# Patient Record
Sex: MALE | Race: WHITE | NOT HISPANIC OR LATINO | URBAN - METROPOLITAN AREA
[De-identification: names, ages, dates, MRNs, and addresses within clinical notes are randomized per-mention and may not be internally consistent; named-entity substitution may affect disease eponyms.]

---

## 2018-06-22 ENCOUNTER — APPOINTMENT (OUTPATIENT)
Dept: URBAN - METROPOLITAN AREA CLINIC 282 | Age: 73
Setting detail: DERMATOLOGY
End: 2018-06-22

## 2018-06-22 DIAGNOSIS — L82.1 OTHER SEBORRHEIC KERATOSIS: ICD-10-CM

## 2018-06-22 DIAGNOSIS — L30.9 DERMATITIS, UNSPECIFIED: ICD-10-CM

## 2018-06-22 DIAGNOSIS — I83.9 ASYMPTOMATIC VARICOSE VEINS OF LOWER EXTREMITIES: ICD-10-CM

## 2018-06-22 DIAGNOSIS — L57.8 OTHER SKIN CHANGES DUE TO CHRONIC EXPOSURE TO NONIONIZING RADIATION: ICD-10-CM

## 2018-06-22 PROBLEM — I83.93 ASYMPTOMATIC VARICOSE VEINS OF BILATERAL LOWER EXTREMITIES: Status: ACTIVE | Noted: 2018-06-22

## 2018-06-22 PROCEDURE — OTHER TREATMENT REGIMEN: OTHER

## 2018-06-22 PROCEDURE — OTHER COUNSELING: OTHER

## 2018-06-22 PROCEDURE — 99203 OFFICE O/P NEW LOW 30 MIN: CPT

## 2018-06-22 PROCEDURE — OTHER PRESCRIPTION: OTHER

## 2018-06-22 PROCEDURE — OTHER ORDER TESTS: OTHER

## 2018-06-22 RX ORDER — CLOBETASOL PROPIONATE 0.5 MG/G
OINTMENT TOPICAL
Qty: 1 | Refills: 1 | Status: ERX | COMMUNITY
Start: 2018-06-22

## 2018-06-22 ASSESSMENT — LOCATION ZONE DERM
LOCATION ZONE: FACE
LOCATION ZONE: LEG
LOCATION ZONE: FEET

## 2018-06-22 ASSESSMENT — LOCATION DETAILED DESCRIPTION DERM
LOCATION DETAILED: LEFT DISTAL CALF
LOCATION DETAILED: LEFT MEDIAL MALAR CHEEK
LOCATION DETAILED: RIGHT DISTAL CALF
LOCATION DETAILED: LEFT MEDIAL DORSAL FOOT
LOCATION DETAILED: LEFT MEDIAL POSTERIOR ANKLE

## 2018-06-22 ASSESSMENT — LOCATION SIMPLE DESCRIPTION DERM
LOCATION SIMPLE: LEFT CHEEK
LOCATION SIMPLE: LEFT POSTERIOR ANKLE
LOCATION SIMPLE: RIGHT CALF
LOCATION SIMPLE: LEFT FOOT
LOCATION SIMPLE: LEFT CALF

## 2018-06-22 NOTE — HPI: RASH
How Severe Is Your Rash?: severe
Is This A New Presentation, Or A Follow-Up?: Rash
Additional History: Rash started 10 years ago, was given Triamcinolone and Nystatin which cleared rash up to a brown/tan patch around the ankle.

## 2018-06-22 NOTE — PROCEDURE: TREATMENT REGIMEN
Samples Given: Cerave cream
Initiate Treatment: clobetasol ointment BIDX 2 weeks then for maintenance\\napply Cerave healing ointment or cream
Detail Level: Detailed

## 2018-07-25 ENCOUNTER — APPOINTMENT (OUTPATIENT)
Dept: URBAN - METROPOLITAN AREA CLINIC 282 | Age: 73
Setting detail: DERMATOLOGY
End: 2018-07-25

## 2018-07-25 DIAGNOSIS — L30.9 DERMATITIS, UNSPECIFIED: ICD-10-CM

## 2018-07-25 DIAGNOSIS — L81.9 DISORDER OF PIGMENTATION, UNSPECIFIED: ICD-10-CM

## 2018-07-25 PROCEDURE — OTHER COUNSELING: OTHER

## 2018-07-25 PROCEDURE — 99213 OFFICE O/P EST LOW 20 MIN: CPT

## 2018-07-25 ASSESSMENT — LOCATION ZONE DERM
LOCATION ZONE: FEET
LOCATION ZONE: LEG

## 2018-07-25 ASSESSMENT — LOCATION SIMPLE DESCRIPTION DERM
LOCATION SIMPLE: LEFT POSTERIOR ANKLE
LOCATION SIMPLE: LEFT FOOT

## 2018-07-25 ASSESSMENT — LOCATION DETAILED DESCRIPTION DERM
LOCATION DETAILED: LEFT MEDIAL DORSAL FOOT
LOCATION DETAILED: LEFT MEDIAL POSTERIOR ANKLE

## 2018-07-25 ASSESSMENT — SEVERITY ASSESSMENT: SEVERITY: CLEAR

## 2021-09-02 ENCOUNTER — OFFICE VISIT (OUTPATIENT)
Dept: URBAN - METROPOLITAN AREA CLINIC 30 | Facility: CLINIC | Age: 76
End: 2021-09-02
Payer: MEDICARE

## 2021-09-02 DIAGNOSIS — H04.123 TEAR FILM INSUFFICIENCY OF BILATERAL LACRIMAL GLANDS: ICD-10-CM

## 2021-09-02 PROCEDURE — 92134 CPTRZ OPH DX IMG PST SGM RTA: CPT | Performed by: OPTOMETRIST

## 2021-09-02 PROCEDURE — 99204 OFFICE O/P NEW MOD 45 MIN: CPT | Performed by: OPTOMETRIST

## 2021-09-02 ASSESSMENT — INTRAOCULAR PRESSURE
OS: 11
OD: 11

## 2021-09-02 ASSESSMENT — KERATOMETRY
OD: 45.69
OS: 45.64

## 2021-09-02 ASSESSMENT — VISUAL ACUITY
OS: 20/60
OD: 20/60

## 2021-09-02 NOTE — IMPRESSION/PLAN
Impression: Exudative macular degeneration, with active choroidal neovascularization, left eye: H35.3221. Denies FHx Plan: Pt educ, rec retina team within 1 week. MAC OCT today.

## 2021-09-02 NOTE — IMPRESSION/PLAN
Impression: Nonexudative macular degeneration, intermediate dry stage, right eye: H35.1888. Plan: Pt educ on findings. Rec AREDs 2 vitamins, sun protection, leafy greens, etc. Non smoker. No signs of exudation on exam or OCT. Pt instructed to contact us immediately if notices decline in vision.

## 2021-09-02 NOTE — IMPRESSION/PLAN
Impression: Combined forms of age-related cataract, bilateral: H25.813. Plan: Consider tx after wet AMD OS is addressed.

## 2021-09-02 NOTE — IMPRESSION/PLAN
Impression: Tear film insufficiency of bilateral lacrimal glands: H04.123. Plan: Rec ATs BID-QID OU.

## 2021-09-08 ENCOUNTER — OFFICE VISIT (OUTPATIENT)
Dept: URBAN - METROPOLITAN AREA CLINIC 24 | Facility: CLINIC | Age: 76
End: 2021-09-08
Payer: MEDICARE

## 2021-09-08 PROCEDURE — 92134 CPTRZ OPH DX IMG PST SGM RTA: CPT | Performed by: OPHTHALMOLOGY

## 2021-09-08 PROCEDURE — 99204 OFFICE O/P NEW MOD 45 MIN: CPT | Performed by: OPHTHALMOLOGY

## 2021-09-08 PROCEDURE — 67028 INJECTION EYE DRUG: CPT | Performed by: OPHTHALMOLOGY

## 2021-09-08 ASSESSMENT — INTRAOCULAR PRESSURE
OD: 15
OS: 12

## 2021-09-08 NOTE — IMPRESSION/PLAN
Impression: Cataract, bilateral: H25.813. Plan: Consider tx after wet AMD OS is addressed.   AGREE -- Fall '21 plan Ce/IOL OU

## 2021-09-08 NOTE — IMPRESSION/PLAN
Impression: DRY AMD right eye: U15.1163. Plan: DRY RIGHT eye --  Specialized examination and high-resolution imaging confirm retinal changes c/w DRY Macular Degeneration. AREDS2 formulation options reviewed  (PRNutr), diet, fish, Amsler, non-smoking.

## 2021-09-08 NOTE — IMPRESSION/PLAN
Impression: NEW WET AMD  left eye: C97.0663. Plan: LEFT eye NEW WET AMD --  Examination and imaging confirm retinal changes c/w a form of WET macular degeneration. New Avastin injection given for retinal edema / disease activity. All r/b/a reviewed. Off-label use in eye of FDA-approved drug. Theoretical low (but not zero) risk of adverse ocular event or ATE --understood and accepted. Costs / expense / insurance / risk understood -accepted. FUTURE consider BELVEDERE FOR PDS implant OS. TODAY Avastin OS injx (Proc note). RTC 4-5w HRA baseline and plan Avastin #2 initial OS   Future ND? IF improving, ALLOW Ce/IOL surgery. THEN after 4-6injx OS PLAN BELVEDERE OS Careful review of pt case, current imaging, known pathophysiology of condition, consider imaging upon next return as noted above.   MAURICE

## 2021-10-18 ENCOUNTER — OFFICE VISIT (OUTPATIENT)
Dept: URBAN - METROPOLITAN AREA CLINIC 30 | Facility: CLINIC | Age: 76
End: 2021-10-18
Payer: MEDICARE

## 2021-10-18 PROCEDURE — 92242 FLUORESCEIN&ICG ANGIOGRAPHY: CPT | Performed by: OPHTHALMOLOGY

## 2021-10-18 PROCEDURE — 92250 FUNDUS PHOTOGRAPHY W/I&R: CPT | Performed by: OPHTHALMOLOGY

## 2021-10-18 PROCEDURE — 67028 INJECTION EYE DRUG: CPT | Performed by: OPHTHALMOLOGY

## 2021-10-18 PROCEDURE — 92134 CPTRZ OPH DX IMG PST SGM RTA: CPT | Performed by: OPHTHALMOLOGY

## 2021-10-18 ASSESSMENT — INTRAOCULAR PRESSURE
OS: 10
OD: 13

## 2021-10-18 NOTE — IMPRESSION/PLAN
Impression: NEW WET AMD  left eye: I41.1330. Injx OS x Oct '21 Plan: LEFT eye NEW WET AMD -- FUTURE consider  PDS implant OS or perhaps FDA approved PDS (Fuch's pt w pending Ce/IOL surgery). ALTERNATIVE may be ATMOSPHERE so that PCIOL surgery and Fuch's are less of a concern w PDS and Randy Laboy. TODAY Avastin OS injx (Proc note). RTC 4-6w ND/inj/pos OCT - plan Avastin #3 initial OS   Future exam ? 
NOW ALLOW Ce/IOL surgery. THEN after 4-6injx OS PLAN PDS OS   Future ATMOSPHERE or PDS

## 2021-10-18 NOTE — IMPRESSION/PLAN
Impression: Cataract, bilateral: H25.813. Plan: Consider tx after wet AMD OS is addressed.   AGREE --OK NOW to RE-MEASURE and PLAN FOR Fall '21 Ce/IOL OU -- please PROCEED Ce/IOL

## 2021-11-29 ENCOUNTER — OFFICE VISIT (OUTPATIENT)
Dept: URBAN - METROPOLITAN AREA CLINIC 30 | Facility: CLINIC | Age: 76
End: 2021-11-29
Payer: MEDICARE

## 2021-11-29 DIAGNOSIS — H35.3221 EXUDATIVE AGE-RELATED MACULAR DEGENERATION, LEFT EYE, WITH ACTIVE CHOROIDAL NEOVASCULARIZATION: Primary | ICD-10-CM

## 2021-11-29 PROCEDURE — 92134 CPTRZ OPH DX IMG PST SGM RTA: CPT | Performed by: OPHTHALMOLOGY

## 2021-11-29 PROCEDURE — 67028 INJECTION EYE DRUG: CPT | Performed by: OPHTHALMOLOGY

## 2021-11-29 ASSESSMENT — INTRAOCULAR PRESSURE
OD: 13
OS: 13

## 2021-11-29 NOTE — IMPRESSION/PLAN
Impression: NEW WET AMD  left eye: Y30.5263. Injx OS x Oct '21 Plan: LEFT eye NEW WET AMD -- FUTURE consider  PDS implant OS or perhaps FDA approved PDS (Fuch's pt w pending Ce/IOL surgery). ALTERNATIVE may be ATMOSPHERE so that PCIOL surgery and Fuch's are less of a concern w PDS and Tempie Feathers. TODAY Avastin OS injx (Proc note). RTC 4-6w  dil, OCT, eval - h/o Avastin #4  initial OS  INJX INFERIOR   Future HRA? PROCEED with  Ce/IOL surgery. THEN after 4-6injx OS PLAN PDS OS Future ATMOSPHERE or PDS options -- d/w'd pt.    Add to [address]

## 2021-12-23 ENCOUNTER — OFFICE VISIT (OUTPATIENT)
Dept: URBAN - METROPOLITAN AREA CLINIC 30 | Facility: CLINIC | Age: 76
End: 2021-12-23
Payer: MEDICARE

## 2021-12-23 PROCEDURE — 99214 OFFICE O/P EST MOD 30 MIN: CPT | Performed by: OPTOMETRIST

## 2021-12-23 PROCEDURE — 92134 CPTRZ OPH DX IMG PST SGM RTA: CPT | Performed by: OPTOMETRIST

## 2021-12-23 ASSESSMENT — KERATOMETRY
OS: 45.64
OD: 45.52

## 2021-12-23 ASSESSMENT — INTRAOCULAR PRESSURE
OD: 12
OS: 12

## 2021-12-23 ASSESSMENT — VISUAL ACUITY
OD: 20/40
OS: 20/40

## 2021-12-23 NOTE — IMPRESSION/PLAN
Impression: Combined forms of age-related cataract, bilateral: H25.813. Plan: Discussed cataracts with patient. Discussed treatment options. Surgical treatment is recommended. Surgical risks and benefits discussed. Patient elects surgical treatment. Recommend surgery OU, OD first. Patient is candidate/interested in toric lens/astigmatism correction, LenSx,  standard lens, ORA. Aim OD: plano. Aim OS: plano. Patient advised of implications of lost myopia. Consider near aim. Outcome of surgery limitations include:  Fuchs' dystrophy, Nonexudative macular degeneration, intermediate dry stage, right eye, Exudative age-related macular degeneration, left eye, with active choroidal neovascularization.

## 2021-12-23 NOTE — IMPRESSION/PLAN
Impression: NEW WET AMD  left eye: M66.2991. Injx OS x Oct '21 Plan: No SRF on MAC OCT. Continue care c Dr. Tabatha Haji.

## 2021-12-23 NOTE — IMPRESSION/PLAN
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 Plan: Continue to monitor. Discussed tx options in event of progression.

## 2021-12-23 NOTE — IMPRESSION/PLAN
Impression: Nonexudative macular degeneration, intermediate dry stage, right eye: H35.4328. Plan: Continue AREDs 2 vitamins, sun protection, leafy greens, etc. Non smoker. No signs of exudation on exam or OCT. Pt instructed to contact us immediately if notices decline in vision.

## 2022-01-14 ENCOUNTER — OFFICE VISIT (OUTPATIENT)
Dept: URBAN - METROPOLITAN AREA CLINIC 24 | Facility: CLINIC | Age: 77
End: 2022-01-14
Payer: MEDICARE

## 2022-01-14 PROCEDURE — 99214 OFFICE O/P EST MOD 30 MIN: CPT | Performed by: OPHTHALMOLOGY

## 2022-01-14 PROCEDURE — 67028 INJECTION EYE DRUG: CPT | Performed by: OPHTHALMOLOGY

## 2022-01-14 PROCEDURE — 92134 CPTRZ OPH DX IMG PST SGM RTA: CPT | Performed by: OPHTHALMOLOGY

## 2022-01-14 ASSESSMENT — INTRAOCULAR PRESSURE
OS: 10
OD: 14

## 2022-01-14 NOTE — IMPRESSION/PLAN
Impression: Cataract, bilateral: H25.813. Plan:    AGREE -- Fall '21 was plan Ce/IOL OU -- did not do. MUST PROCEED ce/IOL '24 [facility] G[place]e. . . mild.   CONT w Dr. Fermin Bravo for Ce/IOL  [facility]

## 2022-01-14 NOTE — IMPRESSION/PLAN
Impression: NEW WET AMD  left  H35.3793. Injx OS x Oct '21 Plan: hx: [[LEFT NEW WET AMD - FUTURE consider PDS implant OS (study or SUSVIMO  - Fuch's pt w pending Ce/IOL surgery). ALTERNATIVE may be ATMOSPHERE or BURGUNDY so that PCIOL surgery and Fuch's are less of a concern. TODAY Avastin OS injx (Proc note). RTC 4-6w HRA / plan Avastin #5  initial OS  INJX INFERIOR    Future ND? PROCEED with  Ce/IOL surgery. THEN after 4-6injx OS PLAN future study  ATMOSPHERE/BURGUNDY  (or BELVEDERE) -- d/w'd pt.    Add to [address]

## 2022-02-01 ENCOUNTER — ADULT PHYSICAL (OUTPATIENT)
Dept: URBAN - METROPOLITAN AREA CLINIC 30 | Facility: CLINIC | Age: 77
End: 2022-02-01
Payer: MEDICARE

## 2022-02-01 DIAGNOSIS — Z01.818 ENCOUNTER FOR OTHER PREPROCEDURAL EXAMINATION: Primary | ICD-10-CM

## 2022-02-01 DIAGNOSIS — H25.813 COMBINED FORMS OF AGE-RELATED CATARACT, BILATERAL: Primary | ICD-10-CM

## 2022-02-01 PROCEDURE — 99203 OFFICE O/P NEW LOW 30 MIN: CPT | Performed by: PHYSICIAN ASSISTANT

## 2022-02-01 ASSESSMENT — PACHYMETRY
OD: 24.43
OD: 3.87
OS: 24.46
OS: 3.72

## 2022-02-02 ENCOUNTER — PRE-OPERATIVE VISIT (OUTPATIENT)
Dept: URBAN - METROPOLITAN AREA CLINIC 24 | Facility: CLINIC | Age: 77
End: 2022-02-02
Payer: MEDICARE

## 2022-02-02 DIAGNOSIS — H35.3112 NONEXUDATIVE MACULAR DEGENERATION, INTERMEDIATE DRY STAGE, RIGHT EYE: ICD-10-CM

## 2022-02-02 DIAGNOSIS — H18.513 FUCHS' DYSTROPHY: ICD-10-CM

## 2022-02-02 PROCEDURE — 99204 OFFICE O/P NEW MOD 45 MIN: CPT | Performed by: OPHTHALMOLOGY

## 2022-02-02 PROCEDURE — 99214 OFFICE O/P EST MOD 30 MIN: CPT | Performed by: OPHTHALMOLOGY

## 2022-02-02 RX ORDER — PREDNISOLONE ACETATE 10 MG/ML
1 % SUSPENSION/ DROPS OPHTHALMIC
Qty: 5 | Refills: 1 | Status: ACTIVE
Start: 2022-02-02

## 2022-02-02 RX ORDER — DICLOFENAC SODIUM 1 MG/ML
0.1 % SOLUTION/ DROPS OPHTHALMIC
Qty: 5 | Refills: 1 | Status: ACTIVE
Start: 2022-02-02

## 2022-02-02 RX ORDER — OFLOXACIN 3 MG/ML
0.3 % SOLUTION/ DROPS OPHTHALMIC
Qty: 5 | Refills: 1 | Status: ACTIVE
Start: 2022-02-02

## 2022-02-02 ASSESSMENT — INTRAOCULAR PRESSURE
OS: 13
OD: 17

## 2022-02-02 NOTE — IMPRESSION/PLAN
Impression: Combined forms of age-related cataract, bilateral: H25.813. Plan: Discussed cataract diagnosis with the patient. Discussed risks, benefits and alternatives to surgery including but not limited to: bleeding, infection, risk of vision loss, loss of the eye, need for other surgery. Patient voiced understanding and wishes to proceed. Patient elects surgical treatment. Advanced technology options Discussed with patient . Patient desires surgery OU, OD   first (( AIM - 0.25 OU, no DEXYCU, gtts d/t study w/  [facility], moxi, Topical anesthesia, no Lensx, ORA OU, hx of FUCHS')) Patient understands the need for glasses after surgery for BCVA.

## 2022-02-02 NOTE — IMPRESSION/PLAN
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 Plan: Monitor. Discussed with patient, understands this may limit vision after surgery.

## 2022-02-02 NOTE — IMPRESSION/PLAN
Impression: Nonexudative macular degeneration, intermediate dry stage, right eye: H35.3112. Plan: undilated exam, Macular degeneration, dry type, appears progressed with decreased vision. Discussed diagnosis in detail with patient. Use of vitamins has shown to improve the effects of ARMD. Discussed treatment options with patient. Counseling about the benefits and/or risks of the Age-Related Eye Disease Study (AREDS) formulation for preventing progression of age-related macular degeneration (AMD) was provided to the patient. Advised patient to eat green leafy vegetables. Use of Amsler grid was explained. No fluid or heme noted. Will continue to monitor vision and the patient has been instructed to call with any vision changes. Discussed with patient, understands this may limit vision after surgery.

## 2022-02-02 NOTE — IMPRESSION/PLAN
Impression: NEW WET AMD  left  H35.3221. Injx OS x Oct '21 Plan: hx: [[LEFT NEW WET AMD - FUTURE consider PDS implant OS. Continue all care w/ Dr. Shabana Duff. Discussed with patient, understands this may limit vision after surgery.

## 2022-02-08 ENCOUNTER — SURGERY (OUTPATIENT)
Dept: URBAN - METROPOLITAN AREA SURGERY 12 | Facility: SURGERY | Age: 77
End: 2022-02-08
Payer: MEDICARE

## 2022-02-08 PROCEDURE — PR1CP PR1CP: CUSTOM | Performed by: OPHTHALMOLOGY

## 2022-02-08 PROCEDURE — 66984 XCAPSL CTRC RMVL W/O ECP: CPT | Performed by: OPHTHALMOLOGY

## 2022-02-09 ENCOUNTER — POST-OPERATIVE VISIT (OUTPATIENT)
Dept: URBAN - METROPOLITAN AREA CLINIC 30 | Facility: CLINIC | Age: 77
End: 2022-02-09

## 2022-02-09 DIAGNOSIS — Z48.810 ENCOUNTER FOR SURGICAL AFTERCARE FOLLOWING SURGERY ON A SENSE ORGAN: Primary | ICD-10-CM

## 2022-02-09 PROCEDURE — 99024 POSTOP FOLLOW-UP VISIT: CPT | Performed by: OPTOMETRIST

## 2022-02-09 ASSESSMENT — INTRAOCULAR PRESSURE: OD: 15

## 2022-02-09 NOTE — IMPRESSION/PLAN
Impression: S/P ORA/Cataract Extraction by phacoemulsification with IOL placement OD - 1 Day. Encounter for surgical aftercare following surgery on a sense organ  Z48.810.  Plan: 3 PO drops-gave bubble chart to pt
rtc 1 week PO

## 2022-02-15 ENCOUNTER — POST-OPERATIVE VISIT (OUTPATIENT)
Dept: URBAN - METROPOLITAN AREA CLINIC 30 | Facility: CLINIC | Age: 77
End: 2022-02-15
Payer: MEDICARE

## 2022-02-15 PROCEDURE — 99024 POSTOP FOLLOW-UP VISIT: CPT | Performed by: OPTOMETRIST

## 2022-02-15 ASSESSMENT — INTRAOCULAR PRESSURE
OS: 15
OD: 14

## 2022-02-15 ASSESSMENT — VISUAL ACUITY
OD: 20/50
OS: 20/60

## 2022-02-15 ASSESSMENT — KERATOMETRY
OS: 45.20
OD: 45.95

## 2022-02-15 NOTE — IMPRESSION/PLAN
Impression: S/P ORA/Cataract Extraction by phacoemulsification with IOL placement OD - 7 Days. Encounter for surgical aftercare following surgery on a sense organ  Z48.810. Plan: Patient is pleased with vision OD. Limited by Fuchs, AMD. 
ok to proceed with 2nd eye

## 2022-02-17 ENCOUNTER — OFFICE VISIT (OUTPATIENT)
Dept: URBAN - METROPOLITAN AREA CLINIC 24 | Facility: CLINIC | Age: 77
End: 2022-02-17
Payer: MEDICARE

## 2022-02-17 PROCEDURE — 92250 FUNDUS PHOTOGRAPHY W/I&R: CPT | Performed by: OPHTHALMOLOGY

## 2022-02-17 PROCEDURE — 67028 INJECTION EYE DRUG: CPT | Performed by: OPHTHALMOLOGY

## 2022-02-17 PROCEDURE — 92134 CPTRZ OPH DX IMG PST SGM RTA: CPT | Performed by: OPHTHALMOLOGY

## 2022-02-17 ASSESSMENT — INTRAOCULAR PRESSURE
OS: 8
OD: 11

## 2022-02-17 NOTE — IMPRESSION/PLAN
Impression: NEW WET AMD  left  H35.3221. Injx OS x Oct '21 Plan: hx: [[LEFT NEW WET AMD - FUTURE consider PDS implant OS (study or SUSVIMO  - Fuch's pt w pending Ce/IOL surgery). ALTERNATIVE may be ATMOSPHERE or BURGUNDY so that PCIOL surgery and Fuch's are less of a concern]]. TODAY Avastin OS injx (Proc note - DONE w Ce/IOL OS 2/22/22). RTC 4-6w ND/inj/OCT PLAN INFERIOR site Avastin INJX (1st inj Sep '21). IF INJX is done do INFERIOR INJX (future PDS?) AFTER Ce/IOL surgery EVAL options for BURGUNDY SCREENING OS May 2022 -- ONLY if FAIL BURGUNDY consider future ASCENT (or AUG '22 [facility]) -- d/w'd pt.    Add to [address] and to [address]

## 2022-02-22 ENCOUNTER — SURGERY (OUTPATIENT)
Dept: URBAN - METROPOLITAN AREA SURGERY 12 | Facility: SURGERY | Age: 77
End: 2022-02-22
Payer: MEDICARE

## 2022-02-22 DIAGNOSIS — H25.812 COMBINED FORMS OF AGE-RELATED CATARACT, LEFT EYE: Primary | ICD-10-CM

## 2022-02-22 PROCEDURE — 66984 XCAPSL CTRC RMVL W/O ECP: CPT | Performed by: OPHTHALMOLOGY

## 2022-02-22 PROCEDURE — PR1CP PR1CP: CUSTOM | Performed by: OPHTHALMOLOGY

## 2022-02-23 ENCOUNTER — POST-OPERATIVE VISIT (OUTPATIENT)
Dept: URBAN - METROPOLITAN AREA CLINIC 30 | Facility: CLINIC | Age: 77
End: 2022-02-23

## 2022-02-23 DIAGNOSIS — Z96.1 PRESENCE OF INTRAOCULAR LENS: Primary | ICD-10-CM

## 2022-02-23 PROCEDURE — 99024 POSTOP FOLLOW-UP VISIT: CPT | Performed by: OPTOMETRIST

## 2022-02-23 ASSESSMENT — INTRAOCULAR PRESSURE: OS: 15

## 2022-02-23 NOTE — IMPRESSION/PLAN
Impression: S/P Cataract Extraction by phacoemulsification with IOL placement; ORA OS - 1 Day. Presence of intraocular lens  Z96.1. Plan: Continue ATs TID-QID OU. 
3 PO drops-gave chart (no dexycu) RTC for final PO. pt is pleased thus far

## 2022-03-22 ENCOUNTER — POST-OPERATIVE VISIT (OUTPATIENT)
Dept: URBAN - METROPOLITAN AREA CLINIC 30 | Facility: CLINIC | Age: 77
End: 2022-03-22

## 2022-03-22 PROCEDURE — 99024 POSTOP FOLLOW-UP VISIT: CPT | Performed by: OPTOMETRIST

## 2022-03-22 ASSESSMENT — VISUAL ACUITY
OD: 20/40
OS: 20/30

## 2022-03-22 ASSESSMENT — INTRAOCULAR PRESSURE
OD: 15
OS: 16

## 2022-03-22 ASSESSMENT — KERATOMETRY
OS: 44.72
OD: 45.77

## 2022-03-22 NOTE — IMPRESSION/PLAN
Impression: S/P Cataract Extraction by phacoemulsification with IOL placement; ORA OS - 28 Days. Encounter for surgical aftercare following surgery on a sense organ  Z48.810. Plan: AMD limits, MAC OCT today: CME OD and few cysts OS
resume pred OD TID and diclofenac TID OD until upcoming retina appt with Dr. Claudio Dunbar PO c REFRACTION c Dr. Fany Taveras in 1 month.

## 2022-04-01 ENCOUNTER — OFFICE VISIT (OUTPATIENT)
Dept: URBAN - METROPOLITAN AREA CLINIC 10 | Facility: CLINIC | Age: 77
End: 2022-04-01
Payer: MEDICARE

## 2022-04-01 PROCEDURE — 92134 CPTRZ OPH DX IMG PST SGM RTA: CPT | Performed by: OPHTHALMOLOGY

## 2022-04-01 PROCEDURE — 67028 INJECTION EYE DRUG: CPT | Performed by: OPHTHALMOLOGY

## 2022-04-01 PROCEDURE — 99214 OFFICE O/P EST MOD 30 MIN: CPT | Performed by: OPHTHALMOLOGY

## 2022-04-01 ASSESSMENT — INTRAOCULAR PRESSURE
OD: 12
OS: 12

## 2022-04-01 NOTE — IMPRESSION/PLAN
Impression: NEW WET AMD  OS  H35.3221. Injx OS x Oct '21 Plan: hx: [[LEFT NEW WET AMD - FUTURE consider PDS implant OS (study or SUSVIMO  - Fuch's pt w pending Ce/IOL surgery). ALTERNATIVE may be ATMOSPHERE or BURGUNDY so that PCIOL surgery and Fuch's are less of a concern]]. TODAY Avastin OS injx (Proc note - DONE w Ce/IOL OS 2/22/22). RTC 4-6w EXAM and EVAL in PRICE -- IF STABLE, RE-CONSIDER LIZA or BELV Pt was cautious, but still wants to CONSIDER BURGUNDY or Filipe Mariel is done do INFERIOR INJX (future PDS study? -- Last injx OS 4/1/22) Ce/IOL surgery DONE -- EVAL options for BURGUNDY SCREENING OS May 2022 -- ONLY if FAIL BURGUNDY consider future ASCENT (or AUG '[facility]) -- d/w'd pt.    Add to [address] and to [address]

## 2022-04-01 NOTE — IMPRESSION/PLAN
Impression: Tear film insufficiency Plan: This is NOT disqualifying K ANDRY. Few guttae is NOT classic FUCH's. Still OK for study. Consider options.

## 2022-04-22 ENCOUNTER — POST-OPERATIVE VISIT (OUTPATIENT)
Dept: URBAN - METROPOLITAN AREA CLINIC 30 | Facility: CLINIC | Age: 77
End: 2022-04-22
Payer: MEDICARE

## 2022-04-22 DIAGNOSIS — Z48.810 ENCOUNTER FOR SURGICAL AFTERCARE FOLLOWING SURGERY ON A SENSE ORGAN: Primary | ICD-10-CM

## 2022-04-22 PROCEDURE — 99024 POSTOP FOLLOW-UP VISIT: CPT | Performed by: OPTOMETRIST

## 2022-04-22 ASSESSMENT — INTRAOCULAR PRESSURE
OS: 12
OD: 13

## 2022-04-22 ASSESSMENT — KERATOMETRY
OS: 45.55
OD: 45.77

## 2022-04-22 ASSESSMENT — VISUAL ACUITY
OD: 20/40
OS: 20/30

## 2022-04-22 NOTE — IMPRESSION/PLAN
Impression:  Encounter for surgical aftercare following surgery on a sense organ  Z48.810. Plan: CME resolved OD, can d/c pred and diclofenac (stopped 2 days ago)
call if s/sx recur
defers spec rx, happy with outcome of cataract sx Dec 2022 CE c Dr Pily Ross; Dr Corry Hope as scheduled

## 2022-05-12 ENCOUNTER — OFFICE VISIT (OUTPATIENT)
Dept: URBAN - METROPOLITAN AREA CLINIC 24 | Facility: CLINIC | Age: 77
End: 2022-05-12
Payer: MEDICARE

## 2022-05-12 DIAGNOSIS — H04.123 TEAR FILM INSUFFICIENCY OF BILATERAL LACRIMAL GLANDS: ICD-10-CM

## 2022-05-12 DIAGNOSIS — H35.3221 EXUDATIVE AGE-RELATED MACULAR DEGENERATION, LEFT EYE, WITH ACTIVE CHOROIDAL NEOVASCULARIZATION: Primary | ICD-10-CM

## 2022-05-12 PROCEDURE — 67028 INJECTION EYE DRUG: CPT | Performed by: OPHTHALMOLOGY

## 2022-05-12 PROCEDURE — 99214 OFFICE O/P EST MOD 30 MIN: CPT | Performed by: OPHTHALMOLOGY

## 2022-05-12 PROCEDURE — 92134 CPTRZ OPH DX IMG PST SGM RTA: CPT | Performed by: OPHTHALMOLOGY

## 2022-05-12 ASSESSMENT — INTRAOCULAR PRESSURE
OD: 11
OS: 10

## 2022-05-12 NOTE — IMPRESSION/PLAN
Impression: NEW WET AMD  OS  H35.3221. Injx OS x Oct '21 Plan: hx: [[LEFT NEW WET AMD - FUTURE consider PDS implant OS (study or SUSVIMO  - Fuch's pt w pending Ce/IOL surgery). ALTERNATIVE may be ATMOSPHERE or BURGUNDY so that PCIOL surgery and Fuch's are less of a concern]]. TODAY Avastin OS injx (Proc note - DONE w Ce/IOL OS 2/22/22). RTC 4-6w  ND/inj/OCT plan Avastin OS INFERIOR site Pt is cautious, (ADD 5/5/22 email note:  FOR NOW pt wants ONGOING INJECTIONS and has addt'l QUESTIONS for PDS implant SHANNAN or Jimbo Weir -- likely Manuel Kent unless pt decides otherwise for STUDY). IF Carolee Limb is done do INFERIOR INJX (future PDS study? -- Last injx OS 4/1/22) Ce/IOL surgery DONE -- EVAL options for BURGUNDY SCREENING OS May 2022 -- ONLY if FAIL BURGUNDY consider future ASCENT (or AUG '22 [facility]) -- d/w'd pt.    Add to [address] and to [address]

## 2022-06-14 ENCOUNTER — OFFICE VISIT (OUTPATIENT)
Dept: URBAN - METROPOLITAN AREA CLINIC 10 | Facility: CLINIC | Age: 77
End: 2022-06-14
Payer: MEDICARE

## 2022-06-14 DIAGNOSIS — H35.3221 EXUDATIVE AGE-RELATED MACULAR DEGENERATION, LEFT EYE, WITH ACTIVE CHOROIDAL NEOVASCULARIZATION: Primary | ICD-10-CM

## 2022-06-14 DIAGNOSIS — H04.123 TEAR FILM INSUFFICIENCY OF BILATERAL LACRIMAL GLANDS: ICD-10-CM

## 2022-06-14 PROCEDURE — 67028 INJECTION EYE DRUG: CPT | Performed by: OPHTHALMOLOGY

## 2022-06-14 PROCEDURE — 92134 CPTRZ OPH DX IMG PST SGM RTA: CPT | Performed by: OPHTHALMOLOGY

## 2022-06-14 ASSESSMENT — INTRAOCULAR PRESSURE
OS: 12
OD: 12

## 2022-06-14 NOTE — IMPRESSION/PLAN
Impression: Tear film insufficiency Plan: This is NOT disqualifying NOELLE WILDE. Few guttae is NOT classic FUCH's. Still OK for study. Consider options.   ADD VISTA [employer] No

## 2022-06-14 NOTE — IMPRESSION/PLAN
Impression: NEW WET AMD  OS  H35.3221. Injx OS x Oct '21 Plan: hx: [[LEFT NEW WET AMD - FUTURE consider PDS implant OS (study or SUSVIMO  - Fuch's pt w pending Ce/IOL surgery). ALTERNATIVE may be ATMOSPHERE or BURGUNDY so that PCIOL surgery and Fuch's are less of a concern]]. TODAY Avastin OS injx (Proc note - DONE w Ce/IOL OS 2/22/22). RTC 4-6w dil, OCT, eval - h/o Avastin OS INFERIOR site Pt is cautious, (ADD 5/5/22 email note:  FOR NOW pt wants ONGOING INJECTIONS and has addt'l QUESTIONS for PDS implant SHANNAN or Romain Ruby -- likely Melissa Neighbors unless pt decides otherwise for STUDY). IF Rudolm Darter is done do INFERIOR INJX (future PDS study? -- Last injx OS 4/1/22) Ce/IOL surgery DONE -- Pt deferred BURGUNDY SCREENING OS spring '22 -- future ASCENT (or AUG '22 [facility]) -- d/w'd pt.    Add to [address] and to [address]

## 2022-07-27 ENCOUNTER — PROCEDURE (OUTPATIENT)
Dept: URBAN - METROPOLITAN AREA CLINIC 24 | Facility: CLINIC | Age: 77
End: 2022-07-27
Payer: MEDICARE

## 2022-07-27 DIAGNOSIS — H35.3221 EXUDATIVE AGE-RELATED MACULAR DEGENERATION, LEFT EYE, WITH ACTIVE CHOROIDAL NEOVASCULARIZATION: Primary | ICD-10-CM

## 2022-07-27 DIAGNOSIS — H04.123 TEAR FILM INSUFFICIENCY OF BILATERAL LACRIMAL GLANDS: ICD-10-CM

## 2022-07-27 PROCEDURE — 67028 INJECTION EYE DRUG: CPT | Performed by: OPHTHALMOLOGY

## 2022-07-27 PROCEDURE — 92134 CPTRZ OPH DX IMG PST SGM RTA: CPT | Performed by: OPHTHALMOLOGY

## 2022-07-27 PROCEDURE — 99214 OFFICE O/P EST MOD 30 MIN: CPT | Performed by: OPHTHALMOLOGY

## 2022-07-27 ASSESSMENT — INTRAOCULAR PRESSURE
OD: 10
OS: 9

## 2022-07-27 NOTE — IMPRESSION/PLAN
Impression: NEW WET AMD  OS  H35.3221. Injx OS x Oct '21 Plan: hx: [[LEFT NEW WET AMD - FUTURE consider PDS implant OS (study or SUSVIMO  - Fuch's pt w pending Ce/IOL surgery). ALTERNATIVE may be ATMOSPHERE or BURGUNDY so that PCIOL surgery and Fuch's are less of a concern]]. TODAY Avastin OS injx (Proc note - DONE w Ce/IOL OS 2/22/22) RTC 4-6w pos HRA / plan Avastin OS INFERIOR site inj (FUTURE SUSVIMO?) Pt is cautious, (ADD 5/5/22 email note:  FOR NOW pt wants ONGOING INJECTIONS and has addt'l QUESTIONS for PDS implant BURGUNDY or [employer] -- likely Theodis Dubin unless pt decides otherwise for STUDY). IF Leetta Sables is done do INFERIOR INJX (future PDS study? -- Last injx OS 4/1/22) Ce/IOL surgery DONE -- Pt deferred BURGUNDY SCREENING OS spring '22 -- future ASCENT (or AUG [facility]) -- d/w'd pt.    Add to [address] and to [address]

## 2022-07-27 NOTE — IMPRESSION/PLAN
Impression: Tear film insufficiency Plan: TODAY exam shows good surface, improved on AT's. Reassured pt: This is NOT disqualifying K SICCA. Few guttae is NOT classic FUCH's. Still OK for study. Consider options.   ADD VISTA [employer]

## 2022-09-07 ENCOUNTER — OFFICE VISIT (OUTPATIENT)
Dept: URBAN - METROPOLITAN AREA CLINIC 24 | Facility: CLINIC | Age: 77
End: 2022-09-07
Payer: MEDICARE

## 2022-09-07 DIAGNOSIS — H35.3221 EXUDATIVE AGE-RELATED MACULAR DEGENERATION, LEFT EYE, WITH ACTIVE CHOROIDAL NEOVASCULARIZATION: Primary | ICD-10-CM

## 2022-09-07 PROCEDURE — 92134 CPTRZ OPH DX IMG PST SGM RTA: CPT | Performed by: OPHTHALMOLOGY

## 2022-09-07 PROCEDURE — 67028 INJECTION EYE DRUG: CPT | Performed by: OPHTHALMOLOGY

## 2022-09-07 PROCEDURE — 92250 FUNDUS PHOTOGRAPHY W/I&R: CPT | Performed by: OPHTHALMOLOGY

## 2022-09-07 PROCEDURE — 92242 FLUORESCEIN&ICG ANGIOGRAPHY: CPT | Performed by: OPHTHALMOLOGY

## 2022-09-07 ASSESSMENT — INTRAOCULAR PRESSURE
OS: 6
OD: 6

## 2022-09-07 NOTE — IMPRESSION/PLAN
Impression: NEW WET AMD  OS  H35.3221. Injx OS x Oct '21 Plan: hx: [[LEFT NEW WET AMD - FUTURE consider PDS implant OS (study or SUSVIMO  - Fuch's pt w pending Ce/IOL surgery). ALTERNATIVE may be ATMOSPHERE or BURGUNDY so that PCIOL surgery and Fuch's are less of a concern]]. TODAY Avastin OS inj (Proc note - DONE w Ce/IOL OS 2/22/22) RTC 4-6w pos  ND/inj/OCT plan Avastin OS INFERIOR site inj (FUTURE SUSVIMO?) Pt is cautious, (ADD 5/5/22 email note:  FOR NOW pt wants ONGOING INJECTIONS and has addt'l QUESTIONS for PDS implant SHANNAN or Chon Quezada -- likely [place]  unless pt decides otherwise for STUDY). IF Eather Relic is done do INFERIOR INJX (future PDS study? -- Last injx OS 4/1/22) Ce/IOL surgery DONE -- Pt deferred BURGUNDY SCREENING OS spring '22 -- future ASCENT (or AUG '22 [facility]) -- d/w'd pt.    Add to  [address]

## 2022-10-24 ENCOUNTER — PROCEDURE (OUTPATIENT)
Dept: URBAN - METROPOLITAN AREA CLINIC 24 | Facility: CLINIC | Age: 77
End: 2022-10-24
Payer: MEDICARE

## 2022-10-24 DIAGNOSIS — H04.123 TEAR FILM INSUFFICIENCY OF BILATERAL LACRIMAL GLANDS: ICD-10-CM

## 2022-10-24 DIAGNOSIS — H35.3221 EXUDATIVE AGE-RELATED MACULAR DEGENERATION, LEFT EYE, WITH ACTIVE CHOROIDAL NEOVASCULARIZATION: Primary | ICD-10-CM

## 2022-10-24 PROCEDURE — 67028 INJECTION EYE DRUG: CPT | Performed by: OPHTHALMOLOGY

## 2022-10-24 PROCEDURE — 92134 CPTRZ OPH DX IMG PST SGM RTA: CPT | Performed by: OPHTHALMOLOGY

## 2022-10-24 ASSESSMENT — INTRAOCULAR PRESSURE
OS: 15
OD: 18

## 2022-10-24 NOTE — IMPRESSION/PLAN
Impression: NEW WET AMD  OS  H35.3221. Injx OS x Oct '21 Plan: hx: [[LEFT NEW WET AMD - FUTURE consider PDS implant OS (study or SUSVIMO  - Fuch's pt w pending Ce/IOL surgery). ALTERNATIVE may be ATMOSPHERE or BURGUNDY so that PCIOL surgery and Fuch's are less of a concern]]. TODAY Avstn OS inj (Proc note - DONE w Ce/IOL OS 2/22/22) RTC 4-6w dil, pos OCT, eval -h/o Avstn OS INFERIOR site inj (future SUSVIMO 2.0) Pt is cautious, (ADD 5/5/22 email note:  FOR NOW pt wants ONGOING INJECTIONS and has addt'l QUESTIONS for PDS implant SHANNAN or Ofelia Nichols -- likely Cooper Duong unless pt decides otherwise for STUDY). IF Alis Livingston is done do INFERIOR INJX (future PDS study? -- Last injx OS 4/1/22) Ce/IOL surgery DONE -- Pt deferred BURGUNDY SCREENING OS spring '22 -- future ASCENT (or AUG '22 [facility]) -- d/w'd pt.    Add to  [address]

## 2022-12-05 ENCOUNTER — OFFICE VISIT (OUTPATIENT)
Dept: URBAN - METROPOLITAN AREA CLINIC 24 | Facility: CLINIC | Age: 77
End: 2022-12-05
Payer: MEDICARE

## 2022-12-05 DIAGNOSIS — H04.123 TEAR FILM INSUFFICIENCY OF BILATERAL LACRIMAL GLANDS: ICD-10-CM

## 2022-12-05 DIAGNOSIS — H35.3221 EXUDATIVE AGE-RELATED MACULAR DEGENERATION, LEFT EYE, WITH ACTIVE CHOROIDAL NEOVASCULARIZATION: Primary | ICD-10-CM

## 2022-12-05 PROCEDURE — 99214 OFFICE O/P EST MOD 30 MIN: CPT | Performed by: OPHTHALMOLOGY

## 2022-12-05 PROCEDURE — 92134 CPTRZ OPH DX IMG PST SGM RTA: CPT | Performed by: OPHTHALMOLOGY

## 2022-12-05 PROCEDURE — 67028 INJECTION EYE DRUG: CPT | Performed by: OPHTHALMOLOGY

## 2022-12-05 ASSESSMENT — INTRAOCULAR PRESSURE
OD: 10
OS: 10

## 2022-12-05 NOTE — IMPRESSION/PLAN
Impression: Tear film insufficiency Plan: TODAY repeated exam w good surface, improved on AT's. Reassured pt: This is NOT disqualifying K SICCA. Few guttae is NOT classic FUCH's. Still OK for study. Consider options.   ADD VISTA [employer]

## 2022-12-05 NOTE — IMPRESSION/PLAN
Impression: NEW WET AMD  OS  H35.3221. Injx OS x Oct '21 INFERIOR injx. PDS 2.0 future? Plan: hx: [[LEFT NEW WET AMD - FUTURE consider PDS implant OS (study or SUSVIMO  - Fuch's pt w pending Ce/IOL surgery). ALTERNATIVE may be ATMOSPHERE or BURGUNDY so that PCIOL surgery and Fuch's are less of a concern]]. TODAY Avstn OS inj (Proc note - DONE w Ce/IOL OS 2/22/22) RTC  6w pos HRA update / plan Avstn OS INFERIOR site inj (future SUSVIMO 2.0) Pt is cautious, (ADD 5/5/22 email note:  FOR NOW pt wants ONGOING INJECTIONS and has addt'l QUESTIONS for PDS implant BURGUNDY or Magaly Bonds -- likely [place] unless pt decides otherwise for STUDY). IF Nini Landon is done do INFERIOR INJX (future PDS study? -- Last injx OS 4/1/22) Ce/IOL surgery DONE -- Pt deferred BURGUNDY SCREENING OS spring '22 -- future ASCENT (or AUG '22 [facility]) -- d/w'd pt.    Add to  [address]

## 2022-12-19 ENCOUNTER — OFFICE VISIT (OUTPATIENT)
Dept: URBAN - METROPOLITAN AREA CLINIC 30 | Facility: CLINIC | Age: 77
End: 2022-12-19
Payer: MEDICARE

## 2022-12-19 DIAGNOSIS — H35.3112 NONEXUDATIVE MACULAR DEGENERATION, INTERMEDIATE DRY STAGE, RIGHT EYE: ICD-10-CM

## 2022-12-19 DIAGNOSIS — H35.3221 EXUDATIVE MACULAR DEGENERATION, WITH ACTIVE CHOROIDAL NEOVASCULARIZATION, LEFT EYE: ICD-10-CM

## 2022-12-19 DIAGNOSIS — Z96.1 PRESENCE OF PSEUDOPHAKIA: Primary | ICD-10-CM

## 2022-12-19 PROCEDURE — 92014 COMPRE OPH EXAM EST PT 1/>: CPT | Performed by: OPTOMETRIST

## 2022-12-19 ASSESSMENT — VISUAL ACUITY
OD: 20/30
OS: 20/40

## 2022-12-19 ASSESSMENT — INTRAOCULAR PRESSURE
OD: 10
OS: 10

## 2022-12-19 ASSESSMENT — KERATOMETRY
OD: 45.77
OS: 45.49

## 2022-12-19 NOTE — IMPRESSION/PLAN
Impression: Presence of pseudophakia: Z96.1. Plan: Pt is pleased with outcome of cataract surgeries, opts for otc readers. Discussed PC haze.

## 2022-12-19 NOTE — IMPRESSION/PLAN
Impression: NEW WET AMD  OS  H35.7752. Injx OS x Oct '21 Plan: s/p multiple injections, cont care c Dr. Ritika Zimmerman as scheduled next month.

## 2022-12-19 NOTE — IMPRESSION/PLAN
Impression: Nonexudative macular degeneration, intermediate dry stage, right eye: H35.2968. Plan: Cont care c Dr. Valentina Hidalgo, cont AREDS 2 vitamins.

## 2023-01-23 ENCOUNTER — OFFICE VISIT (OUTPATIENT)
Dept: URBAN - METROPOLITAN AREA CLINIC 30 | Facility: CLINIC | Age: 78
End: 2023-01-23
Payer: MEDICARE

## 2023-01-23 DIAGNOSIS — H35.3211 EXDTVE AGE-REL MCLR DEGN, RIGHT EYE, WITH ACTV CHRDL NEOVAS: ICD-10-CM

## 2023-01-23 DIAGNOSIS — H35.3221 EXUDATIVE AGE-RELATED MACULAR DEGENERATION, LEFT EYE, WITH ACTIVE CHOROIDAL NEOVASCULARIZATION: Primary | ICD-10-CM

## 2023-01-23 DIAGNOSIS — H35.3112 NONEXUDATIVE MACULAR DEGENERATION, INTERMEDIATE DRY STAGE, RIGHT EYE: ICD-10-CM

## 2023-01-23 DIAGNOSIS — H35.3231 BILATERAL EXUDATIVE AGE-RELATED MACULAR DEGENERATION W/ ACTIVE CHOROIDAL NEOVASCULARIZATION: ICD-10-CM

## 2023-01-23 PROCEDURE — 92242 FLUORESCEIN&ICG ANGIOGRAPHY: CPT | Performed by: OPHTHALMOLOGY

## 2023-01-23 PROCEDURE — 67028 INJECTION EYE DRUG: CPT | Performed by: OPHTHALMOLOGY

## 2023-01-23 PROCEDURE — 92250 FUNDUS PHOTOGRAPHY W/I&R: CPT | Performed by: OPHTHALMOLOGY

## 2023-01-23 PROCEDURE — 92134 CPTRZ OPH DX IMG PST SGM RTA: CPT | Performed by: OPHTHALMOLOGY

## 2023-01-23 PROCEDURE — 99214 OFFICE O/P EST MOD 30 MIN: CPT | Performed by: OPHTHALMOLOGY

## 2023-01-23 ASSESSMENT — INTRAOCULAR PRESSURE
OS: 12
OD: 13

## 2023-01-23 NOTE — IMPRESSION/PLAN
Impression: OD new fluid Jan '23 ? MONITOR caution H35.3211. Plan: DRY RIGHT prior -- Specialized examination and high-resolution imaging  update -- OD early leak? LONG d/w pt -- May be new CNVM OD Consider injx OU IN FUTURE? AREDS2 (VISTA), diet, fish, Amsler, non-smoking.

## 2023-01-23 NOTE — IMPRESSION/PLAN
Impression:  WET AMD  OS  x Oct '21 OD NEW active Jan '23 ? INFERIOR injx. PDS 2.0 future? Plan: hx: [[LEFT NEW WET AMD - FUTURE consider PDS implant OS (study or SUSVIMO - Fuch's pt w Ce/IOL). ALTERNATIVE may be BURGUNDY so PCIOL surg /Fuch's are less of a concern -- NEW FLUID Jan '23 OD?? -- DONE Ce/IOL Feb '22]]. TODAY Avstn OS inj (Proc note) RTC  6w ND/inj/OCT -plan Avstn OS INFERIOR inj (future SUSVIMO 2.0) RE-EXAMINE OD -- RISK of CNVM OD new Jan '23 fluid? LONG d/w pt -- May be new CNVM OD Pt is cautious, (ADD 5/5/22 email note:  FOR NOW pt wants ONGOING INJECTIONS and has addt'l QUESTIONS for PDS implant SHANNAN or Castro Hutson -- likely Dolly Flattery unless pt decides otherwise for STUDY). IF Kevin Zuleta is done do INFERIOR INJX (future PDS study? -- Last injx OS 4/1/22) Ce/IOL surgery DONE -- Pt deferred BURGUNDY SCREENING OS spring '22 -- future ASCENT (or AUG '22 [facility]) -- d/w'd pt.    Add to  [address]

## 2023-03-06 ENCOUNTER — OFFICE VISIT (OUTPATIENT)
Dept: URBAN - METROPOLITAN AREA CLINIC 30 | Facility: CLINIC | Age: 78
End: 2023-03-06
Payer: MEDICARE

## 2023-03-06 DIAGNOSIS — H35.3231 EXUDATIVE AGE-RELATED MACULAR DEGENERATION, BILATERAL, WITH ACTIVE CHOROIDAL NEOVASCULARIZATION: Primary | ICD-10-CM

## 2023-03-06 PROCEDURE — 92134 CPTRZ OPH DX IMG PST SGM RTA: CPT | Performed by: OPHTHALMOLOGY

## 2023-03-06 PROCEDURE — 67028 INJECTION EYE DRUG: CPT | Performed by: OPHTHALMOLOGY

## 2023-03-06 ASSESSMENT — INTRAOCULAR PRESSURE
OS: 18
OD: 17

## 2023-03-06 NOTE — IMPRESSION/PLAN
Impression: WET AMD  OS  x Oct '21 OD NEW active Jan '23 -- injx OU INFERIOR inj Byooviz. PDS 2.0 future? Plan: hx: [[LEFT NEW WET AMD - FUTURE consider PDS implant OS (study or SUSVIMO - Fuch's pt w Ce/IOL). ALTERNATIVE may be BURGUNDY so PCIOL surg /Fuch's are less of a concern -- NEW FLUID Jan '23 OD -- DONE Ce/IOL Feb '22]]. TODAY BYOOVIZ OS inj (Proc note - NEW CNVM OD Jan '23) RTC 6w ND2 inj/OCT plan BYOOVIZ OU INFERIOR inj (future SUSVIMO 2.0) LONG d/w pt -- New CNVM OD Pt is cautious, (ADD 5/5/22 email note:  FOR NOW pt wants ONGOING INJECTIONS and has addt'l QUESTIONS for PDS implant SHANNAN or Jim Isaacs -- likely Ltanya Gala unless pt decides otherwise for STUDY). . . move to ON-LABEL meds now in '23 --  

IF Patrizia [place] is done do INFERIOR INJX (future PDS study? -- Last injx OS 4/1/22) Ce/IOL surgery DONE -- Pt deferred BURGUNDY SCREENING OS spring '22 -- future ASCENT [facility] -- d/w'd pt. Add to  [address] FDA-apprv'd on-label biosimilar BYOOVIZ. Prefer to non-FDA-apprv'd off-label Avastin. Biosimilar to ranibizimab injx. D/w'd pt r/b/a and options. Pt expressed understanding desires proceed. On-label FDA-approved drug, yet remains theoretical low (but not zero) risk adverse events -Understood.

## 2023-04-17 ENCOUNTER — PROCEDURE (OUTPATIENT)
Dept: URBAN - METROPOLITAN AREA CLINIC 30 | Facility: CLINIC | Age: 78
End: 2023-04-17
Payer: MEDICARE

## 2023-04-17 DIAGNOSIS — H35.3231 BILATERAL EXUDATIVE AGE-RELATED MACULAR DEGENERATION W/ ACTIVE CHOROIDAL NEOVASCULARIZATION: Primary | ICD-10-CM

## 2023-04-17 PROCEDURE — 92134 CPTRZ OPH DX IMG PST SGM RTA: CPT | Performed by: OPHTHALMOLOGY

## 2023-04-17 ASSESSMENT — INTRAOCULAR PRESSURE
OD: 15
OS: 13

## 2023-04-17 NOTE — IMPRESSION/PLAN
Impression: WET AMD  OS  x Oct '21 OD NEW active Jan '23 -- injx OU INFERIOR inj Byooviz. PDS 2.0 future? Plan: hx: [[LEFT NEW WET AMD - FUTURE consider PDS implant OS (study or SUSVIMO - Fuch's pt w Ce/IOL). ALTERNATIVE may be BURGUNDY so PCIOL surg /Fuch's are less of a concern -- NEW FLUID Jan '23 OD -- DONE Ce/IOL Feb '22]]. TODAY BYOOVIZ OS inj (Proc note - NEW CNVM OD Jan '23) RTC 6w dil, colors, eval - h/o BYOOVIZ OU INFERIOR inj (future SUSVIMO 2.0) LONG d/w pt -- New CNVM OD Pt is cautious, (ADD 5/5/22 email note:  FOR NOW pt wants ONGOING INJx - has addt'l QUESTIONS for PDS implant SHANNAN or Lakshmi Simms -- likely [address] [place] unless pt decides otherwise for STUDY). . . move to ON-LABEL meds now in '23 --  

IF Talha Chemo is done do INFERIOR INJX (future PDS study? -- Last injx OS 4/1/22) Ce/IOL surgery DONE -- Pt deferred BURGUNDY SCREENING OS spring '22 -- future ASCENT (or AUG '22 [facility]) -- d/w'd pt. Add to  [address] FDA-apprv'd on-label biosimilar BYOOVIZ.   Prefer to non-FDA-apprv'd off-label Avastin

## 2023-05-18 ENCOUNTER — APPOINTMENT (OUTPATIENT)
Dept: URBAN - METROPOLITAN AREA CLINIC 224 | Age: 78
Setting detail: DERMATOLOGY
End: 2023-05-22

## 2023-05-18 DIAGNOSIS — I87.2 VENOUS INSUFFICIENCY (CHRONIC) (PERIPHERAL): ICD-10-CM

## 2023-05-18 DIAGNOSIS — L30.0 NUMMULAR DERMATITIS: ICD-10-CM

## 2023-05-18 PROCEDURE — OTHER COUNSELING: OTHER

## 2023-05-18 PROCEDURE — 99213 OFFICE O/P EST LOW 20 MIN: CPT

## 2023-05-18 PROCEDURE — OTHER PRESCRIPTION: OTHER

## 2023-05-18 PROCEDURE — OTHER PRESCRIPTION MEDICATION MANAGEMENT: OTHER

## 2023-05-18 PROCEDURE — OTHER MIPS QUALITY: OTHER

## 2023-05-18 RX ORDER — DESOXIMETASONE 2.5 MG/G
OINTMENT TOPICAL
Qty: 60 | Refills: 2 | Status: ERX | COMMUNITY
Start: 2023-05-18

## 2023-05-18 ASSESSMENT — LOCATION SIMPLE DESCRIPTION DERM
LOCATION SIMPLE: LEFT PRETIBIAL REGION
LOCATION SIMPLE: RIGHT PRETIBIAL REGION

## 2023-05-18 ASSESSMENT — BSA RASH: BSA RASH: 3

## 2023-05-18 ASSESSMENT — SEVERITY ASSESSMENT: SEVERITY: MODERATE

## 2023-05-18 ASSESSMENT — LOCATION ZONE DERM: LOCATION ZONE: LEG

## 2023-05-18 ASSESSMENT — LOCATION DETAILED DESCRIPTION DERM
LOCATION DETAILED: RIGHT LATERAL DISTAL PRETIBIAL REGION
LOCATION DETAILED: LEFT DISTAL PRETIBIAL REGION

## 2023-05-18 NOTE — PROCEDURE: PRESCRIPTION MEDICATION MANAGEMENT
Render In Strict Bullet Format?: No
Plan: Wear compression socks daily.
Discontinue Regimen: Applying Mupirocin
Detail Level: Zone
Initiate Treatment: Desoximetasone 0.25 % topical ointment \\nApply to affected areas twice daily, then PRN flares
Discontinue Regimen: Triamcinolone and Mupirocin
Continue Regimen: Remaining course of Doxycycline
Plan: Avoid scented soaps and moisturizers. Use gentle cleansers only.

## 2023-05-18 NOTE — PROCEDURE: MIPS QUALITY
Detail Level: Detailed
Quality 110: Preventive Care And Screening: Influenza Immunization: Influenza immunization was not ordered or administered, reason not given
Quality 111:Pneumonia Vaccination Status For Older Adults: Patient did not receive any pneumococcal conjugate or polysaccharide vaccine on or after their 60th birthday and before the end of the measurement period
Quality 226: Preventive Care And Screening: Tobacco Use: Screening And Cessation Intervention: Patient screened for tobacco use and is an ex/non-smoker
Quality 402: Tobacco Use And Help With Quitting Among Adolescents: Patient screened for tobacco and never smoked

## 2023-06-02 ENCOUNTER — OFFICE VISIT (OUTPATIENT)
Dept: URBAN - METROPOLITAN AREA CLINIC 24 | Facility: CLINIC | Age: 78
End: 2023-06-02
Payer: MEDICARE

## 2023-06-02 DIAGNOSIS — H35.3231 EXUDATIVE AGE-RELATED MACULAR DEGENERATION, BILATERAL, WITH ACTIVE CHOROIDAL NEOVASCULARIZATION: Primary | ICD-10-CM

## 2023-06-02 PROCEDURE — 92134 CPTRZ OPH DX IMG PST SGM RTA: CPT | Performed by: OPHTHALMOLOGY

## 2023-06-02 PROCEDURE — 99214 OFFICE O/P EST MOD 30 MIN: CPT | Performed by: OPHTHALMOLOGY

## 2023-06-02 ASSESSMENT — INTRAOCULAR PRESSURE
OD: 11
OS: 9

## 2023-06-02 NOTE — IMPRESSION/PLAN
Impression: WET AMD  OS  x Oct '21 OD NEW active Jan '23 -- injx OU INFERIOR inj Byooviz. PDS 2.0 future? Plan: hx: [[LEFT NEW WET AMD - FUTURE consider PDS implant OS (study or SUSVIMO - Fuch's pt w Ce/IOL). ALTERNATIVE may be BURGUNDY so PCIOL surg /Fuch's are less of a concern -- NEW FLUID Jan '23 OD -- DONE Ce/IOL Feb '22]]. TODAY BYOOVIZ OS inj (Proc note - NEW CNVM OD Jan '23) RTC 6w Plan HRA / plan BYOOVIZ OU INFERIOR inj (future SUSVIMO 2.0) LONG d/w pt -- New CNVM OD Pt is cautious, (ADD 5/5/22 email note:  FOR NOW pt wants ONGOING INJx - has addt'l QUESTIONS for PDS implant SHANNAN or Noemi De La Paz -- likely Kai Jayla unless pt decides otherwise for STUDY). . . move to ON-LABEL meds now in '23 --  

IF Beola Turner is done do INFERIOR INJX (future PDS study? -- Last injx OS 4/1/22) Ce/IOL surgery DONE -- Pt deferred BURGUNDY SCREENING OS spring '22 -- future ASCENT (or AUG '22 [facility]) -- d/w'd pt. Add to  [address] FDA-apprv'd on-label biosimilar BYOOVIZ.   Prefer to non-FDA-apprv'd off-label Avastin

## 2023-08-21 ENCOUNTER — OFFICE VISIT (OUTPATIENT)
Dept: URBAN - METROPOLITAN AREA CLINIC 30 | Facility: CLINIC | Age: 78
End: 2023-08-21
Payer: MEDICARE

## 2023-08-21 DIAGNOSIS — H35.3231 EXUDATIVE AGE-RELATED MACULAR DEGENERATION, BILATERAL, WITH ACTIVE CHOROIDAL NEOVASCULARIZATION: Primary | ICD-10-CM

## 2023-08-21 PROCEDURE — 92242 FLUORESCEIN&ICG ANGIOGRAPHY: CPT | Performed by: OPHTHALMOLOGY

## 2023-08-21 PROCEDURE — 92250 FUNDUS PHOTOGRAPHY W/I&R: CPT | Performed by: OPHTHALMOLOGY

## 2023-08-21 PROCEDURE — 92134 CPTRZ OPH DX IMG PST SGM RTA: CPT | Performed by: OPHTHALMOLOGY

## 2023-08-21 ASSESSMENT — INTRAOCULAR PRESSURE
OD: 8
OS: 9

## 2023-10-02 ENCOUNTER — OFFICE VISIT (OUTPATIENT)
Dept: URBAN - METROPOLITAN AREA CLINIC 30 | Facility: CLINIC | Age: 78
End: 2023-10-02
Payer: MEDICARE

## 2023-10-02 DIAGNOSIS — H35.3231 EXUDATIVE AGE-RELATED MACULAR DEGENERATION, BILATERAL, WITH ACTIVE CHOROIDAL NEOVASCULARIZATION: Primary | ICD-10-CM

## 2023-10-02 PROCEDURE — 92134 CPTRZ OPH DX IMG PST SGM RTA: CPT | Performed by: OPHTHALMOLOGY

## 2023-10-02 ASSESSMENT — INTRAOCULAR PRESSURE
OS: 8
OD: 8

## 2023-11-13 ENCOUNTER — OFFICE VISIT (OUTPATIENT)
Dept: URBAN - METROPOLITAN AREA CLINIC 30 | Facility: CLINIC | Age: 78
End: 2023-11-13
Payer: MEDICARE

## 2023-11-13 DIAGNOSIS — H35.3231 EXUDATIVE AGE-RELATED MACULAR DEGENERATION, BILATERAL, WITH ACTIVE CHOROIDAL NEOVASCULARIZATION: Primary | ICD-10-CM

## 2023-11-13 PROCEDURE — 92134 CPTRZ OPH DX IMG PST SGM RTA: CPT | Performed by: OPHTHALMOLOGY

## 2023-11-13 PROCEDURE — 99214 OFFICE O/P EST MOD 30 MIN: CPT | Performed by: OPHTHALMOLOGY

## 2023-11-13 ASSESSMENT — INTRAOCULAR PRESSURE
OD: 12
OS: 12

## 2023-12-18 ENCOUNTER — OFFICE VISIT (OUTPATIENT)
Dept: URBAN - METROPOLITAN AREA CLINIC 30 | Facility: CLINIC | Age: 78
End: 2023-12-18
Payer: MEDICARE

## 2023-12-18 DIAGNOSIS — Z96.1 PRESENCE OF PSEUDOPHAKIA: ICD-10-CM

## 2023-12-18 DIAGNOSIS — H43.813 VITREOUS DEGENERATION, BILATERAL: ICD-10-CM

## 2023-12-18 DIAGNOSIS — H18.003 BILATERAL CORNEAL DEPOSITS: ICD-10-CM

## 2023-12-18 PROCEDURE — 92134 CPTRZ OPH DX IMG PST SGM RTA: CPT | Performed by: OPTOMETRIST

## 2023-12-18 PROCEDURE — 92014 COMPRE OPH EXAM EST PT 1/>: CPT | Performed by: OPTOMETRIST

## 2023-12-18 ASSESSMENT — KERATOMETRY
OS: 45.55
OD: 45.55

## 2023-12-18 ASSESSMENT — VISUAL ACUITY
OS: 20/40
OD: 20/40

## 2023-12-18 ASSESSMENT — INTRAOCULAR PRESSURE
OD: 12
OS: 12

## 2024-01-08 ENCOUNTER — OFFICE VISIT (OUTPATIENT)
Dept: URBAN - METROPOLITAN AREA CLINIC 30 | Facility: CLINIC | Age: 79
End: 2024-01-08
Payer: MEDICARE

## 2024-01-08 DIAGNOSIS — H04.123 TEAR FILM INSUFFICIENCY OF BILATERAL LACRIMAL GLANDS: ICD-10-CM

## 2024-01-08 PROCEDURE — 92134 CPTRZ OPH DX IMG PST SGM RTA: CPT | Performed by: OPHTHALMOLOGY

## 2024-01-08 PROCEDURE — 99214 OFFICE O/P EST MOD 30 MIN: CPT | Performed by: OPHTHALMOLOGY

## 2024-01-08 ASSESSMENT — INTRAOCULAR PRESSURE
OS: 16
OD: 15

## 2024-03-18 ENCOUNTER — OFFICE VISIT (OUTPATIENT)
Dept: URBAN - METROPOLITAN AREA CLINIC 30 | Facility: CLINIC | Age: 79
End: 2024-03-18
Payer: MEDICARE

## 2024-03-18 DIAGNOSIS — H35.3231 EXUDATIVE AGE-RELATED MACULAR DEGENERATION, BILATERAL, WITH ACTIVE CHOROIDAL NEOVASCULARIZATION: Primary | ICD-10-CM

## 2024-03-18 PROCEDURE — 92134 CPTRZ OPH DX IMG PST SGM RTA: CPT | Performed by: OPHTHALMOLOGY

## 2024-03-18 ASSESSMENT — INTRAOCULAR PRESSURE
OD: 13
OS: 12

## 2024-05-13 ENCOUNTER — OFFICE VISIT (OUTPATIENT)
Dept: URBAN - METROPOLITAN AREA CLINIC 30 | Facility: CLINIC | Age: 79
End: 2024-05-13
Payer: MEDICARE

## 2024-05-13 DIAGNOSIS — H35.3231 EXUDATIVE AGE-RELATED MACULAR DEGENERATION, BILATERAL, WITH ACTIVE CHOROIDAL NEOVASCULARIZATION: Primary | ICD-10-CM

## 2024-05-13 PROCEDURE — 92134 CPTRZ OPH DX IMG PST SGM RTA: CPT | Performed by: OPHTHALMOLOGY

## 2024-05-13 PROCEDURE — 99214 OFFICE O/P EST MOD 30 MIN: CPT | Performed by: OPHTHALMOLOGY

## 2024-05-13 ASSESSMENT — INTRAOCULAR PRESSURE
OS: 12
OD: 10

## 2024-07-08 ENCOUNTER — OFFICE VISIT (OUTPATIENT)
Dept: URBAN - METROPOLITAN AREA CLINIC 30 | Facility: CLINIC | Age: 79
End: 2024-07-08
Payer: MEDICARE

## 2024-07-08 DIAGNOSIS — H35.3231 EXUDATIVE AGE-RELATED MACULAR DEGENERATION, BILATERAL, WITH ACTIVE CHOROIDAL NEOVASCULARIZATION: Primary | ICD-10-CM

## 2024-07-08 PROCEDURE — 92134 CPTRZ OPH DX IMG PST SGM RTA: CPT | Performed by: OPHTHALMOLOGY

## 2024-07-08 PROCEDURE — 92242 FLUORESCEIN&ICG ANGIOGRAPHY: CPT | Performed by: OPHTHALMOLOGY

## 2024-07-08 ASSESSMENT — INTRAOCULAR PRESSURE
OD: 10
OS: 11

## 2024-09-16 ENCOUNTER — OFFICE VISIT (OUTPATIENT)
Dept: URBAN - METROPOLITAN AREA CLINIC 30 | Facility: CLINIC | Age: 79
End: 2024-09-16
Payer: MEDICARE

## 2024-09-16 DIAGNOSIS — H35.3231 BILATERAL EXUDATIVE AGE-RELATED MACULAR DEGENERATION W/ ACTIVE CHOROIDAL NEOVASCULARIZATION: Primary | ICD-10-CM

## 2024-09-16 PROCEDURE — 92134 CPTRZ OPH DX IMG PST SGM RTA: CPT | Performed by: OPHTHALMOLOGY

## 2024-09-16 ASSESSMENT — INTRAOCULAR PRESSURE
OD: 7
OS: 9

## 2024-11-11 ENCOUNTER — OFFICE VISIT (OUTPATIENT)
Dept: URBAN - METROPOLITAN AREA CLINIC 30 | Facility: CLINIC | Age: 79
End: 2024-11-11
Payer: MEDICARE

## 2024-11-11 DIAGNOSIS — H35.3231 EXUDATIVE AGE-RELATED MACULAR DEGENERATION, BILATERAL, WITH ACTIVE CHOROIDAL NEOVASCULARIZATION: Primary | ICD-10-CM

## 2024-11-11 DIAGNOSIS — H04.123 TEAR FILM INSUFFICIENCY OF BILATERAL LACRIMAL GLANDS: ICD-10-CM

## 2024-11-11 PROCEDURE — 99214 OFFICE O/P EST MOD 30 MIN: CPT | Performed by: OPHTHALMOLOGY

## 2024-11-11 PROCEDURE — 92134 CPTRZ OPH DX IMG PST SGM RTA: CPT | Performed by: OPHTHALMOLOGY

## 2024-11-11 ASSESSMENT — INTRAOCULAR PRESSURE
OD: 15
OS: 14

## 2024-12-16 ENCOUNTER — OFFICE VISIT (OUTPATIENT)
Dept: URBAN - METROPOLITAN AREA CLINIC 30 | Facility: CLINIC | Age: 79
End: 2024-12-16
Payer: MEDICARE

## 2024-12-16 DIAGNOSIS — H43.813 VITREOUS DEGENERATION, BILATERAL: ICD-10-CM

## 2024-12-16 DIAGNOSIS — H04.123 TEAR FILM INSUFFICIENCY OF BILATERAL LACRIMAL GLANDS: Primary | ICD-10-CM

## 2024-12-16 DIAGNOSIS — H35.3231 BILATERAL EXUDATIVE AGE-RELATED MACULAR DEGENERATION W/ ACTIVE CHOROIDAL NEOVASCULARIZATION: ICD-10-CM

## 2024-12-16 DIAGNOSIS — H26.493 OTHER SECONDARY CATARACT, BILATERAL: ICD-10-CM

## 2024-12-16 PROCEDURE — 92014 COMPRE OPH EXAM EST PT 1/>: CPT | Performed by: OPTOMETRIST

## 2024-12-16 PROCEDURE — 92134 CPTRZ OPH DX IMG PST SGM RTA: CPT | Performed by: OPTOMETRIST

## 2024-12-16 ASSESSMENT — INTRAOCULAR PRESSURE
OD: 14
OS: 15

## 2024-12-16 ASSESSMENT — KERATOMETRY
OD: 45.38
OS: 45.63

## 2024-12-16 ASSESSMENT — VISUAL ACUITY
OS: 20/30
OD: 20/30

## 2025-01-06 ENCOUNTER — OFFICE VISIT (OUTPATIENT)
Dept: URBAN - METROPOLITAN AREA CLINIC 30 | Facility: CLINIC | Age: 80
End: 2025-01-06
Payer: MEDICARE

## 2025-01-06 DIAGNOSIS — H35.3231 EXUDATIVE AGE-RELATED MACULAR DEGENERATION, BILATERAL, WITH ACTIVE CHOROIDAL NEOVASCULARIZATION: Primary | ICD-10-CM

## 2025-01-06 PROCEDURE — 92134 CPTRZ OPH DX IMG PST SGM RTA: CPT | Performed by: OPHTHALMOLOGY

## 2025-01-06 PROCEDURE — 92235 FLUORESCEIN ANGRPH MLTIFRAME: CPT | Performed by: OPHTHALMOLOGY

## 2025-01-06 ASSESSMENT — INTRAOCULAR PRESSURE
OS: 16
OD: 15

## 2025-03-03 ENCOUNTER — OFFICE VISIT (OUTPATIENT)
Dept: URBAN - METROPOLITAN AREA CLINIC 30 | Facility: CLINIC | Age: 80
End: 2025-03-03
Payer: MEDICARE

## 2025-03-03 DIAGNOSIS — H35.3231 EXUDATIVE AGE-RELATED MACULAR DEGENERATION, BILATERAL, WITH ACTIVE CHOROIDAL NEOVASCULARIZATION: Primary | ICD-10-CM

## 2025-03-03 ASSESSMENT — INTRAOCULAR PRESSURE
OS: 16
OD: 17

## 2025-04-28 ENCOUNTER — OFFICE VISIT (OUTPATIENT)
Dept: URBAN - METROPOLITAN AREA CLINIC 30 | Facility: CLINIC | Age: 80
End: 2025-04-28
Payer: MEDICARE

## 2025-04-28 DIAGNOSIS — H35.3231 EXUDATIVE AGE-RELATED MACULAR DEGENERATION, BILATERAL, WITH ACTIVE CHOROIDAL NEOVASCULARIZATION: Primary | ICD-10-CM

## 2025-04-28 DIAGNOSIS — H04.123 TEAR FILM INSUFFICIENCY OF BILATERAL LACRIMAL GLANDS: ICD-10-CM

## 2025-04-28 PROCEDURE — 92134 CPTRZ OPH DX IMG PST SGM RTA: CPT | Performed by: OPHTHALMOLOGY

## 2025-04-28 PROCEDURE — 99214 OFFICE O/P EST MOD 30 MIN: CPT | Performed by: OPHTHALMOLOGY

## 2025-04-28 ASSESSMENT — INTRAOCULAR PRESSURE
OD: 13
OS: 15

## 2025-06-23 ENCOUNTER — OFFICE VISIT (OUTPATIENT)
Dept: URBAN - METROPOLITAN AREA CLINIC 30 | Facility: CLINIC | Age: 80
End: 2025-06-23
Payer: MEDICARE

## 2025-06-23 DIAGNOSIS — H35.3231 EXUDATIVE AGE-RELATED MACULAR DEGENERATION, BILATERAL, WITH ACTIVE CHOROIDAL NEOVASCULARIZATION: Primary | ICD-10-CM

## 2025-06-23 PROCEDURE — 92134 CPTRZ OPH DX IMG PST SGM RTA: CPT | Performed by: OPHTHALMOLOGY

## 2025-06-23 PROCEDURE — 92242 FLUORESCEIN&ICG ANGIOGRAPHY: CPT | Performed by: OPHTHALMOLOGY

## 2025-06-23 ASSESSMENT — INTRAOCULAR PRESSURE
OD: 13
OS: 14

## 2025-08-18 ENCOUNTER — OFFICE VISIT (OUTPATIENT)
Dept: URBAN - METROPOLITAN AREA CLINIC 30 | Facility: CLINIC | Age: 80
End: 2025-08-18
Payer: MEDICARE

## 2025-08-18 DIAGNOSIS — H35.3231 BILATERAL EXUDATIVE AGE-RELATED MACULAR DEGENERATION W/ ACTIVE CHOROIDAL NEOVASCULARIZATION: Primary | ICD-10-CM

## 2025-08-18 PROCEDURE — 92134 CPTRZ OPH DX IMG PST SGM RTA: CPT | Performed by: OPHTHALMOLOGY

## 2025-08-18 ASSESSMENT — INTRAOCULAR PRESSURE
OS: 10
OD: 10